# Patient Record
Sex: MALE | Race: WHITE
[De-identification: names, ages, dates, MRNs, and addresses within clinical notes are randomized per-mention and may not be internally consistent; named-entity substitution may affect disease eponyms.]

---

## 2018-11-21 ENCOUNTER — HOSPITAL ENCOUNTER (OUTPATIENT)
Dept: HOSPITAL 33 - SDC | Age: 58
Discharge: HOME | End: 2018-11-21
Attending: FAMILY MEDICINE
Payer: COMMERCIAL

## 2018-11-21 VITALS — SYSTOLIC BLOOD PRESSURE: 155 MMHG | HEART RATE: 84 BPM | DIASTOLIC BLOOD PRESSURE: 83 MMHG

## 2018-11-21 VITALS — OXYGEN SATURATION: 99 %

## 2018-11-21 DIAGNOSIS — Z12.11: Primary | ICD-10-CM

## 2018-11-21 DIAGNOSIS — Z86.010: ICD-10-CM

## 2018-11-21 PROCEDURE — 94250: CPT

## 2018-11-21 NOTE — OP
SURGERY DATE/TIME:  11/21/2018 0745    



PREOPERATIVE DIAGNOSES:

1) Screening colonoscopy.

2) History of colon polyps. 



POSTOPERATIVE DIAGNOSIS:    Normal colon.  



PROCEDURE:    Colonoscopy. 



SURGEON: Morris Munguia M.D.



ANESTHESIA:  MAC by Juaquin Maharaj CRNA.



ESTIMATED BLOOD LOSS:  None. 



SPECIMENS:  None.          



DESCRIPTION OF PROCEDURE: After informed written consent was obtained, the patient was 
taken to the endoscopy suite.  He underwent monitored anesthesia and digital rectal exam 
showed normal sphincter tone and no internal lesions. The scope was inserted into the 
rectum and sequentially the entire colonic mucosa was traversed. The level of cecum was 
reached and verified with direct visualization of ileocecal valve. Upon withdrawal careful 
mucosal inspection revealed no gross abnormalities or lesions. Prior to withdrawal 
retroflexion was performed and showed no internal lesions.  The scope was removed and the 
patient was transferred to the recovery room in good condition.

## 2019-02-21 ENCOUNTER — HOSPITAL ENCOUNTER (EMERGENCY)
Dept: HOSPITAL 33 - ED | Age: 59
Discharge: HOME | End: 2019-02-21
Payer: COMMERCIAL

## 2019-02-21 VITALS — HEART RATE: 65 BPM | OXYGEN SATURATION: 94 % | SYSTOLIC BLOOD PRESSURE: 111 MMHG | DIASTOLIC BLOOD PRESSURE: 75 MMHG

## 2019-02-21 DIAGNOSIS — M54.5: ICD-10-CM
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
## 2019-02-21 DIAGNOSIS — M62.830: ICD-10-CM

## 2019-02-21 DIAGNOSIS — M54.6: Primary | ICD-10-CM
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
## 2019-02-21 PROCEDURE — 72100 X-RAY EXAM L-S SPINE 2/3 VWS: CPT

## 2019-02-21 PROCEDURE — 99284 EMERGENCY DEPT VISIT MOD MDM: CPT

## 2019-02-21 PROCEDURE — 96372 THER/PROPH/DIAG INJ SC/IM: CPT

## 2019-02-21 PROCEDURE — 72072 X-RAY EXAM THORAC SPINE 3VWS: CPT

## 2019-02-21 NOTE — ERPHSYRPT
- History of Present Illness


Time Seen by Provider: 02/21/19 07:35


Source: patient, family


Patient Subjective Stated Complaint: fell off ladder on Sunday and went to see 

Dr. Santos and she gave him the all clear but in bed last night he heard 

something "pop" in his back and now he is having pain


Triage Nursing Assessment: Pt states that he fell off a ladder on Sunday and 

went to see Dr. Santos and she gave him the all clear but in bed last night he 

heard something "pop" in his back and now he is having pain, pain in medial 

upper back, denies numbness or tingling, denies pain with palpatation, rates 

pain 3/10 while walking, pulses normal, vitals wnl, doesn't appear to be in any 

distress


Physician History: 





57 y/o white male fell approx 6 ft off a ladder onto his back 3 days ago. seen 

by pcp monday and pt states his pcp stated all ok. last pm pt twisted and felt 

a pop and has had worsening pain. hurts to breathe. pt had about 20 norco left 

from recent foot surgery but did not take any. no new falls


Timing/Duration: day(s) (3)


Method of Injury: fall


Quality: sharp, stabbing


Back Pain Location: T-spine, lumbar spine


Severity of Pain-Max: moderate


Severity of Pain-Current: moderate


Modifying Factors: Improves With: movement (worsens)


Associated Symptoms: lower back pain, muscle spasms, No loss of bowel control, 

No constipation, No nausea, No vomiting, No problems urinating, No weakness, No 

sensory/motor loss, No tingling in legs/feet


Previous symptoms: recently seen


Allergies/Adverse Reactions: 








No Known Drug Allergies Allergy (Verified 02/21/19 07:29)


 





Home Medications: 








Lisinopril 40 mg PO DAILY 02/21/19 [History]


hydroCHLOROthiazide [Hydrochlorothiazide] 25 mg PO DAILY 02/21/19 [History]





Hx Influenza Vaccination/Date Given: No


Hx Pneumococcal Vaccination/Date Given: No





- Review of Systems


Constitutional: No Symptoms


Eyes: No Symptoms


Ears, Nose, & Throat: No Symptoms


Respiratory: No Symptoms


Cardiac: No Symptoms


Abdominal/Gastrointestinal: No Symptoms


Genitourinary Symptoms: No Symptoms


Musculoskeletal: Back Pain, Fall


Skin: No Symptoms


Neurological: No Symptoms


Psychological: No Symptoms


Endocrine: No Symptoms


Hematologic/Lymphatic: No Symptoms


Immunological/Allergic: No Symptoms


All Other Systems: Reviewed and Negative





- Past Medical History


Pertinent Past Medical History: Yes


Neurological History: No Pertinent History


ENT History: No Pertinent History


Cardiac History: No Pertinent History


Respiratory History: No Pertinent History


Endocrine Medical History: No Pertinent History


Musculoskeletal History: Fractures, Other


GI Medical History: No Pertinent History


 History: No Pertinent History


Psycho-Social History: No Pertinent History


Male Reproductive Disorders: No Pertinent History


Other Medical History: gout, fx hand ankle nose.





- Past Surgical History


Past Surgical History: No


Neuro Surgical History: No Pertinent History


Cardiac: No Pertinent History


Respiratory: No Pertinent History


Gastrointestinal: No Pertinent History


Genitourinary: No Pertinent History


Musculoskeletal: No Pertinent History


Male Surgical History: No Pertinent History


Other Surgical History: tumor taken off of right foot





- Social History


Smoking Status: Former smoker


Exposure to second hand smoke: Yes


Drug Use: none


Patient Lives Alone: No





- Nursing Vital Signs


Nursing Vital Signs: 


 Initial Vital Signs











Temperature  98.2 F   02/21/19 07:21


 


Pulse Rate  74   02/21/19 07:21


 


Blood Pressure  132/84   02/21/19 07:21


 


O2 Sat by Pulse Oximetry  99   02/21/19 07:21








 Pain Scale











Pain Intensity [Upper Medial   3





Back]                          


 


Pain Intensity                 3

















- Physical Exam


General Appearance: mild distress, alert, anxiety


Eye Exam: PERRL/EOMI


Ears, Nose, Throat Exam: normal ENT inspection, moist mucous membranes


Neck Exam: normal inspection, non-tender, supple, full range of motion


Respiratory Exam: normal breath sounds, lungs clear, airway intact, No 

respiratory distress, No accessory muscle use, No rhonchi, No wheezing, No 

stridor


Cardiovascular Exam: regular rate/rhythm, normal heart sounds, normal 

peripheral pulses


Gastrointestinal Exam: soft, normal bowel sounds, No tenderness, No guarding, 

No rebound


Rectal Exam: not done


Back Exam: vertebral tenderness, decreased range of motion, muscle spasm, other 

(area of midthoracic swelling and tenderness)


Extremity Exam: normal inspection, normal range of motion, pelvis stable


Neurologic Exam: alert, oriented x 3, cooperative, CNs II-XII nml as tested


Skin Exam: normal color, warm, dry


Lymphatic Exam: adenopathy


**SpO2 Interpretation**: normal


SpO2: 99


O2 Delivery: Room Air





- Course


Nursing assessment & vital signs reviewed: Yes


Ordered Tests: 


 Active Orders 24 hr











 Category Date Time Status


 


 LUMBAR LIMITED (2 OR 3 VIEWS) Stat Exams  02/21/19 08:08 Completed


 


 THORACIC SPINE (AP,LAT,SWIMM) Stat Exams  02/21/19 08:08 Completed








Medication Summary














Discontinued Medications














Generic Name Dose Route Start Last Admin





  Trade Name [place]  PRN Reason Stop Dose Admin


 


Hydromorphone HCl  1 mg  02/21/19 08:04  02/21/19 08:10





  Hydromorphone 1 Mg/Ml Ampule***  IM  02/21/19 08:05  1 mg





  STAT ONE   Administration





     





     





     





     


 


Hydromorphone HCl  Confirm  02/21/19 08:07  





  Hydromorphone 1 Mg/Ml Ampule***  Administered  02/21/19 08:08  





  Dose   





  1 mg   





  .ROUTE   





  .STK-MED ONE   





     





     





     





     


 


Promethazine HCl  12.5 mg  02/21/19 08:05  02/21/19 08:11





  Phenergan 25 Mg Inj***  IM  02/21/19 08:06  12.5 mg





  STAT ONE   Administration





     





     





     





     


 


Promethazine HCl  Confirm  02/21/19 08:07  





  Phenergan 25 Mg Inj***  Administered  02/21/19 08:08  





  Dose   





  25 mg   





  .ROUTE   





  .STK-MED ONE   





     





     





     





     














- Progress


Progress: improved


Progress Note: 





02/21/19 09:16


xray thoracic and lumbar spine-no acute fx or subluxation


Counseled pt/family regarding: diagnosis, need for follow-up, rad results





- Departure


Time of Disposition: 09:17


Departure Disposition: Home


Clinical Impression: 


 Back pain due to injury





Condition: Stable


Critical Care Time: No


Referrals: 


ARMANDO SANTOS [Primary Care Provider] - 


Additional Instructions: 


take your norco as prescribed. follow up with primary doctor for further 

management. 


Prescriptions: 


Carisoprodol 350 mg** [Soma 350 mg**] 350 mg PO Q8H PRN PRN #10 tablet


 PRN Reason: Muscle Spasms


Prednisone 10 mg*** [Deltasone 10 mg***] 10 mg PO TID #12 tablet

## 2019-02-21 NOTE — XRAY
Indication: Pain following fall.



Comparison: None



Frontal/lateral thoracic spine demonstrates 12 typical rib-bearing thoracic

vertebral segments in normal alignment with vertebral body heights and disc

spaces maintained.  Mild multilevel degenerative endplate spurring.  No other

bony, articular, or soft tissue abnormalities.



Impression: Nonacute thoracic spine with chronic features.

## 2019-02-21 NOTE — XRAY
Indication: Pain following fall.



Comparison: None



3 views of the lumbar spine demonstrates 4 lumbar segments with sacralized L5

in normal alignment with mild multilevel endplate spurring, minimal L4-L5 disc

space narrowing, mild L4-L5 degenerative facet arthropathy, and minimal aortic

calcifications.  No other bony, articular, or soft tissue abnormalities.



Impression: Nonacute lumbar spine with chronic features.

## 2020-11-24 ENCOUNTER — HOSPITAL ENCOUNTER (OUTPATIENT)
Dept: HOSPITAL 33 - ED | Age: 60
Setting detail: OBSERVATION
LOS: 1 days | Discharge: HOME | End: 2020-11-25
Attending: FAMILY MEDICINE | Admitting: FAMILY MEDICINE
Payer: COMMERCIAL

## 2020-11-24 DIAGNOSIS — Z79.01: ICD-10-CM

## 2020-11-24 DIAGNOSIS — I63.9: Primary | ICD-10-CM

## 2020-11-24 DIAGNOSIS — G51.0: ICD-10-CM

## 2020-11-24 DIAGNOSIS — R47.81: ICD-10-CM

## 2020-11-24 DIAGNOSIS — Z79.899: ICD-10-CM

## 2020-11-24 LAB
ALBUMIN SERPL-MCNC: 4.6 G/DL (ref 3.5–5)
ALP SERPL-CCNC: 81 U/L (ref 38–126)
ALT SERPL-CCNC: 24 U/L (ref 0–50)
ANION GAP SERPL CALC-SCNC: 11.1 MEQ/L (ref 5–15)
AST SERPL QL: 27 U/L (ref 17–59)
BASOPHILS # BLD AUTO: 0.03 10*3/UL (ref 0–0.4)
BASOPHILS NFR BLD AUTO: 0.5 % (ref 0–0.4)
BILIRUB BLD-MCNC: 0.5 MG/DL (ref 0.2–1.3)
BUN SERPL-MCNC: 18 MG/DL (ref 9–20)
CALCIUM SPEC-MCNC: 9.3 MG/DL (ref 8.4–10.2)
CHLORIDE SERPL-SCNC: 103 MMOL/L (ref 98–107)
CO2 SERPL-SCNC: 31 MMOL/L (ref 22–30)
CREAT SERPL-MCNC: 1.09 MG/DL (ref 0.66–1.25)
EOSINOPHIL # BLD AUTO: 0.09 10*3/UL (ref 0–0.5)
GFR SERPLBLD BASED ON 1.73 SQ M-ARVRAT: > 60 ML/MIN
GLUCOSE SERPL-MCNC: 87 MG/DL (ref 74–106)
GLUCOSE UR-MCNC: NEGATIVE MG/DL
HCT VFR BLD AUTO: 49.7 % (ref 42–50)
HGB BLD-MCNC: 16.2 GM/DL (ref 12.5–18)
LYMPHOCYTES # SPEC AUTO: 1.62 10*3/UL (ref 1–4.6)
MCH RBC QN AUTO: 31.1 PG (ref 26–32)
MCHC RBC AUTO-ENTMCNC: 32.6 G/DL (ref 32–36)
MONOCYTES # BLD AUTO: 0.87 10*3/UL (ref 0–1.3)
PLATELET # BLD AUTO: 191 K/MM3 (ref 150–450)
POTASSIUM SERPLBLD-SCNC: 4.1 MMOL/L (ref 3.5–5.1)
PROT SERPL-MCNC: 7.4 G/DL (ref 6.3–8.2)
PROT UR STRIP-MCNC: NEGATIVE MG/DL
RBC # BLD AUTO: 5.21 M/MM3 (ref 4.1–5.6)
RBC #/AREA URNS HPF: (no result) /HPF (ref 0–2)
SODIUM SERPL-SCNC: 141 MMOL/L (ref 137–145)
WBC # BLD AUTO: 5.7 K/MM3 (ref 4–10.5)
WBC #/AREA URNS HPF: (no result) /HPF (ref 0–5)

## 2020-11-24 PROCEDURE — 71045 X-RAY EXAM CHEST 1 VIEW: CPT

## 2020-11-24 PROCEDURE — 93041 RHYTHM ECG TRACING: CPT

## 2020-11-24 PROCEDURE — 93268 ECG RECORD/REVIEW: CPT

## 2020-11-24 PROCEDURE — 70450 CT HEAD/BRAIN W/O DYE: CPT

## 2020-11-24 PROCEDURE — 80053 COMPREHEN METABOLIC PANEL: CPT

## 2020-11-24 PROCEDURE — 83721 ASSAY OF BLOOD LIPOPROTEIN: CPT

## 2020-11-24 PROCEDURE — G0378 HOSPITAL OBSERVATION PER HR: HCPCS

## 2020-11-24 PROCEDURE — 94762 N-INVAS EAR/PLS OXIMTRY CONT: CPT

## 2020-11-24 PROCEDURE — 80061 LIPID PANEL: CPT

## 2020-11-24 PROCEDURE — 81001 URINALYSIS AUTO W/SCOPE: CPT

## 2020-11-24 PROCEDURE — 99285 EMERGENCY DEPT VISIT HI MDM: CPT

## 2020-11-24 PROCEDURE — 84484 ASSAY OF TROPONIN QUANT: CPT

## 2020-11-24 PROCEDURE — 36415 COLL VENOUS BLD VENIPUNCTURE: CPT

## 2020-11-24 PROCEDURE — 92523 SPEECH SOUND LANG COMPREHEN: CPT

## 2020-11-24 PROCEDURE — 70551 MRI BRAIN STEM W/O DYE: CPT

## 2020-11-24 PROCEDURE — 94760 N-INVAS EAR/PLS OXIMETRY 1: CPT

## 2020-11-24 PROCEDURE — 93005 ELECTROCARDIOGRAM TRACING: CPT

## 2020-11-24 PROCEDURE — 85025 COMPLETE CBC W/AUTO DIFF WBC: CPT

## 2020-11-24 NOTE — XRAY
Indication: Confusion, slurred speech, and stroke like symptoms.



Comparison: None



Portable chest demonstrates normal heart and lungs.  Bony thorax intact with

old left 5-7 rib fractures.

## 2020-11-24 NOTE — ERPHSYRPT
- History of Present Illness


Time Seen by Provider: 11/24/20 09:15


Source: patient


Physician History: 





Patient is a 60-year-old male presents to our ED with slurred speech and right-

sided facial droop that has been ongoing for 4 days.  Patient was sent to our ED

for an evaluation per his wife's request.  Patient observed the symptoms on 

Friday.  Patient did not think much of it.  At that time he also experienced a 

mild headache.  Symptoms persisted and family became concerned.  Patient has hi

story of untreated hypertension.  Patient states he also has 

hypercholesterolemia.  Patient chews tobacco.  No history of CVA.  No associated

chest pain or shortness of breath.  No nausea vomiting or diaphoresis.  Symptoms

are moderate in intensity.  No specific worsening or improving factors.  Patient

states otherwise healthy.  He voices no other complaints at this time.


Timing/Duration: day(s) (4 days ago)


Severity: moderate


Modifying Factors: Improves With: nothing


Associated Symptoms: other (Slurred speech)


Allergies/Adverse Reactions: 








No Known Drug Allergies Allergy (Verified 11/24/20 09:35)


   





Home Medications: 








No Reportable Medications [No Reported Medications]  11/24/20 [History]





Hx Influenza Vaccination/Date Given: No


Hx Pneumococcal Vaccination/Date Given: No





- Review of Systems


Constitutional: No Symptoms, No Fever, No Chills


Eyes: No Symptoms


Ears, Nose, & Throat: No Symptoms


Respiratory: No Symptoms, No Cough, No Dyspnea


Cardiac: No Symptoms, No Chest Pain, No Edema, No Syncope


Abdominal/Gastrointestinal: No Symptoms, No Abdominal Pain, No Nausea, No 

Vomiting, No Diarrhea


Genitourinary Symptoms: No Symptoms, No Dysuria


Musculoskeletal: No Symptoms, No Back Pain, No Neck Pain


Skin: No Symptoms, No Rash


Neurological: No Symptoms, No Dizziness, No Focal Weakness, No Sensory Changes


Psychological: No Symptoms


Endocrine: No Symptoms


Hematologic/Lymphatic: No Symptoms


Immunological/Allergic: No Symptoms


All Other Systems: Reviewed and Negative





- Past Medical History


Pertinent Past Medical History: Yes


Neurological History: No Pertinent History


ENT History: No Pertinent History


Cardiac History: No Pertinent History


Respiratory History: No Pertinent History


Endocrine Medical History: No Pertinent History


Musculoskeletal History: Fractures, Other


GI Medical History: No Pertinent History


 History: No Pertinent History


Psycho-Social History: No Pertinent History


Male Reproductive Disorders: No Pertinent History


Other Medical History: gout, fx hand ankle nose.





- Past Surgical History


Past Surgical History: No


Neuro Surgical History: No Pertinent History


Cardiac: No Pertinent History


Respiratory: No Pertinent History


Gastrointestinal: No Pertinent History


Genitourinary: No Pertinent History


Musculoskeletal: No Pertinent History


Male Surgical History: No Pertinent History


Other Surgical History: tumor taken off of right foot





- Social History


Smoking Status: Former smoker


Exposure to second hand smoke: Yes


Drug Use: none


Patient Lives Alone: No





- Nursing Vital Signs


Nursing Vital Signs: 


                               Initial Vital Signs











Temperature  97.8 F   11/24/20 09:08


 


Pulse Rate  63   11/24/20 09:08


 


Respiratory Rate  20   11/24/20 09:08


 


Blood Pressure  172/106   11/24/20 09:08


 


O2 Sat by Pulse Oximetry  100   11/24/20 09:08








                                   Pain Scale











Pain Intensity                 0

















- Physical Exam


General Appearance: no apparent distress, alert


Eye Exam: PERRL/EOMI, eyes nml inspection


Ears, Nose, Throat Exam: normal ENT inspection, TMs normal, pharynx normal, 

moist mucous membranes


Neck Exam: normal inspection, non-tender, supple, full range of motion


Respiratory Exam: normal breath sounds, lungs clear, No respiratory distress


Cardiovascular Exam: regular rate/rhythm, normal heart sounds, normal peripheral

 pulses


Gastrointestinal/Abdomen Exam: soft, normal bowel sounds, No tenderness, No mass


Back Exam: normal inspection, normal range of motion, No CVA tenderness, No 

vertebral tenderness


Extremity Exam: normal inspection, normal range of motion, pelvis stable


Neurologic Exam: alert, oriented x 3, cooperative, normal mood/affect, nml 

cerebellar function, nml station & gait, sensation nml, motor weakness, facial 

droop (Right-sided facial droop mild dysarthria), other (Patient states the 

right side of his face is more sensitive than the left.), No motor deficits, No 

disoriented, No confusion


Skin Exam: normal color, warm, dry, No rash


Lymphatic Exam: No adenopathy


**SpO2 Interpretation**: normal


SpO2: 98


O2 Delivery: Room Air





- Course


Nursing assessment & vital signs reviewed: Yes


EKG Interpreted by Me: RATE (61), Sinus Rhythm, NORMAL AXIS, NORMAL INTERVALS





- Radiology Exams


  ** Chest


X-ray Interpretation: Teleradiologist Report (No acute pathology observed.)





- CT Exams


  ** Head


CT Interpretation: Tele-radiologist Report (No acute intracranial process.)


Ordered Tests: 


                               Active Orders 24 hr











 Category Date Time Status


 


 Cardiac Monitor STAT Care  11/24/20 09:31 Active


 


 EKG-ER Only STAT Care  11/24/20 09:30 Active


 


 IV Insertion STAT Care  11/24/20 09:30 Active


 


 NPO (ED) STAT Care  11/24/20 09:30 Active


 


 POCT Glucose Check STAT Care  11/24/20 09:30 Active


 


 Pulse Oximetry (ED) STAT Care  11/24/20 09:30 Active


 


 CHEST 1 VIEW (PORTABLE) Stat Exams  11/24/20 09:30 Completed


 


 HEAD WITHOUT CONTRAST [CT] Stat Exams  11/24/20 09:30 Completed


 


 CBC W DIFF Stat Lab  11/24/20 09:15 Completed


 


 CMP Stat Lab  11/24/20 09:15 Completed


 


 TROPONIN Q3H Lab  11/24/20 10:15 Ordered


 


 TROPONIN Q3H Lab  11/24/20 13:15 Ordered


 


 TROPONIN Q3H Lab  11/24/20 16:15 Ordered


 


 TROPONIN Q3H Lab  11/24/20 19:15 Ordered


 


 TROPONIN Q3H Lab  11/24/20 22:15 Ordered


 


 UA W/RFX UR CULTURE Stat Lab  11/24/20 10:20 Ordered


 


 Transfer Order Routine Transfer  11/24/20 Ordered








Medication Summary











Generic Name Dose Route Start Last Admin





  Trade Name Freq  PRN Reason Stop Dose Admin


 


Sodium Chloride  1,000 mls @ 50 mls/hr  11/24/20 09:30  11/24/20 09:35





  Sodium Chloride 0.9% 1000 Ml  IV  12/24/20 09:29  50 mls/hr





  .Q20H MIGUE   Administration














Discontinued Medications














Generic Name Dose Route Start Last Admin





  Trade Name Freq  PRN Reason Stop Dose Admin


 


Aspirin  324 mg  11/24/20 10:10  11/24/20 10:12





  Baby Aspirin 81 Mg Chew***  PO  11/24/20 10:11  324 mg





  STAT ONE   Administration


 


Aspirin  Confirm  11/24/20 10:12 





  Baby Aspirin 81 Mg Chew***  Administered  11/24/20 10:13 





  Dose  





  324 mg  





  .ROUTE  





  .STK-MED ONE  











Lab/Rad Data: 


                           Laboratory Result Diagrams





                                 11/24/20 09:15 





                                 11/24/20 09:15 





                               Laboratory Results











  11/24/20 11/24/20 Range/Units





  09:15 09:15 


 


WBC   5.7  (4.0-10.5)  K/mm3


 


RBC   5.21  (4.1-5.6)  M/mm3


 


Hgb   16.2  (12.5-18.0)  gm/dl


 


Hct   49.7  (42-50)  %


 


MCV   95.4  ()  fl


 


MCH   31.1  (26-32)  pg


 


MCHC   32.6  (32-36)  g/dl


 


RDW   13.6  (11.5-14.0)  %


 


Plt Count   191  (150-450)  K/mm3


 


MPV   11.1 H  (7.5-11.0)  fl


 


Gran %   54.4  (36.0-66.0)  %


 


Eos # (Auto)   0.09  (0-0.5)  


 


Absolute Lymphs (auto)   1.62  (1.0-4.6)  


 


Absolute Monos (auto)   0.87  (0.0-1.3)  


 


Lymphocytes %   28.3  (24.0-44.0)  %


 


Monocytes %   15.2 H  (0.0-12.0)  %


 


Eosinophils %   1.6  (0.00-5.0)  %


 


Basophils %   0.5  (0.0-0.4)  %


 


Absolute Granulocytes   3.11  (1.4-6.9)  


 


Basophils #   0.03  (0-0.4)  


 


Sodium  141   (137-145)  mmol/L


 


Potassium  4.1   (3.5-5.1)  mmol/L


 


Chloride  103   ()  mmol/L


 


Carbon Dioxide  31 H   (22-30)  mmol/L


 


Anion Gap  11.1   (5-15)  MEQ/L


 


BUN  18   (9-20)  mg/dL


 


Creatinine  1.09   (0.66-1.25)  mg/dL


 


Estimated GFR  > 60.0   ML/MIN


 


Glucose  87   ()  mg/dL


 


Calcium  9.3   (8.4-10.2)  mg/dL


 


Total Bilirubin  0.50   (0.2-1.3)  mg/dL


 


AST  27   (17-59)  U/L


 


ALT  24   (0-50)  U/L


 


Alkaline Phosphatase  81   ()  U/L


 


Serum Total Protein  7.4   (6.3-8.2)  g/dL


 


Albumin  4.6   (3.5-5.0)  g/dL














- Progress


Progress: improved


Progress Note: 





11/24/20 10:22


Patient reassessed.  Repeat neuro exam unchanged.  CT head negative for acute 

intracranial pathology.  Aspirin administered.  IV fluids normal saline running 

at 75 cc/h.  It appears that patient did have a stroke based on history of pres

ent illness, risk factors and physical exam findings.  Patient will need full 

work-up at the admitting physician's discretion.  Plan of care discussed with 

patient.  He agrees to admission to [facility] for 

further evaluation and treatment.  Patient voices no other complaints or 

concerns at this time.  Case discussed with Dr. Summers covering Dr. Saavedra.  Dr. Summers accepts admission to observation.  Patient is not a candidate for TPA due 

to patient being outside of therapeutic window for TPA.  Patient symptoms 

occurred 4 days ago.


11/24/20 10:24





11/24/20 10:25





Discussed with : Fabiola


Will see patient in: hospital (observation)


Counseled pt/family regarding: lab results, diagnosis, rad results





- Departure


Departure Disposition: Observation


Clinical Impression: 


 Stroke, Dysarthria, Facial droop





Condition: Stable


Critical Care Time: No


Referrals: 


NOHEMI SAAVEDRA MD [Primary Care Provider] -

## 2020-11-24 NOTE — XRAY
Indication: Confusion, slurred speech, and stroke like symptoms.



Multiple contiguous axial images obtained through the head without contrast.



Comparison: None



Age-appropriate global atrophy and minimal periventricular degenerative

micro-ischemia bilaterally.  No acute intracranial hemorrhage, abnormal

extra-axial fluid collection, or mass effect.  Fourth ventricle is midline

without hydrocephalus.  Gray-white matter differentiation preserved.  Bony

calvarium intact.  Visualized paranasal sinuses and mastoid air cells are

clear.



Impression: Normal aging brain including atrophy and degenerative

micro-ischemia.  No acute intracranial abnormalities.

## 2020-11-25 VITALS — OXYGEN SATURATION: 98 % | DIASTOLIC BLOOD PRESSURE: 95 MMHG | HEART RATE: 87 BPM | SYSTOLIC BLOOD PRESSURE: 174 MMHG

## 2020-11-25 LAB
CHOLESTANOL SERPL-MCNC: 215 MG/DL (ref 50–200)
HDLC SERPL-MCNC: 43 MG/DL (ref 40–60)
LDLC SERPL DIRECT ASSAY-MCNC: 133 MG/DL (ref 30–100)
TRIGL SERPL-MCNC: 120 MG/DL (ref 30–150)

## 2020-11-25 NOTE — PCM.SSS
History of Present Illness





- Chief Complaint


Chief Complaint: stroke


History of Present Illness: 


 is a 60 year old male who presented to the ER yesterday, 3-4 days prior

to arrival he had developed slurred speech and drooping of his face on the left,

there were no associated deficits in the upper or lower extremity, he relays 

some cold symptoms prior to onset but never felt poorly. no affect on his 

ability to eat or drink, no problems with vision, no headache. he was admitted 

for possible stroke, ct was negative.








- Review of Systems


Constitutional: No Fever, No Chills


Respiratory: No Cough, No Short Of Breath


Cardiac: No Chest Pain, No Edema, No Syncope


Abdominal/Gastrointestinal: No Abdominal Pain, No Nausea, No Vomiting, No 

Diarrhea


Neurological: Focal Weakness, Speech Changes, No Dizziness, No Gait Changes, No 

Headache, No Seizure, No Tremors, No Vertigo


Psychological: No Symptoms


All Other Systems: Reviewed and Negative





Medications & Allergies


Home Medications: 


                              Home Medication List





Acyclovir 800 mg*** [Zovirax 800 mg***] 800 mg PO 5XD #35 tablet 11/25/20 [Rx]


Prednisone 20 mg*** [Deltasone 20 mg***] 20 mg PO UD #18 tablet 11/25/20 [Rx]








Allergies/Adverse Reactions: 


                                    Allergies











Allergy/AdvReac Type Severity Reaction Status Date / Time


 


No Known Drug Allergies Allergy   Verified 11/24/20 09:35














- Past Medical History


Past Medical History: Yes


Neurological History: No Pertinent History


ENT History: No Pertinent History


Cardiac History: No Pertinent History


Respiratory History: No Pertinent History


Endocrine Medical History: No Pertinent History


Musculoskelatal History: Fractures, Other


GI Medical History: No Pertinent History


 History: No Pertinent History


Pyscho-Social History: No Pertinent History


Male Reproductive Disorders: No Pertinent History


Comment: gout, fx hand ankle nose.





- Past Surgical History


Past Surgical History: No


Neuro Surgical History: No Pertinent History


Cardiac History: No Pertinent History


Respiratory Surgery: No Pertinent History


GI Surgical History: No Pertinent History


Genitourinary Surgical Hx: No Pertinent History


Musculskeletal Surgical Hx: No Pertinent History


Male Surgical History: No Pertinent History


Other Surgical History: tumor taken off of right foot





- Social History


Smoking Status: Former smoker


Exposure to second hand smoke: Yes


Alcohol: None


Drug Use: none





- Physical Exam


Vital Signs: 


                               Vital Signs - 24 hr











  Temp Pulse Resp BP Pulse Ox


 


 11/25/20 08:03  98.4 F  63  16  136/94  97


 


 11/25/20 07:07      95


 


 11/25/20 04:19  98.2 F  75  16  155/85  95


 


 11/24/20 23:42  98.0 F  74  15  126/70  97


 


 11/24/20 20:01  98.3 F  66  16  145/87  95


 


 11/24/20 19:17      93 L


 


 11/24/20 16:00   80  20  161/87  95


 


 11/24/20 15:17      98


 


 11/24/20 11:42  98.5 F  82   148/91  98


 


 11/24/20 10:26      98


 


 11/24/20 10:17   65  18  140/97  98


 


 11/24/20 09:36      100


 


 11/24/20 09:08  97.8 F  63  20  172/106  100











General Appearance: no apparent distress


Neurologic Exam: alert, oriented x 3, cooperative, nml cerebellar function, nml 

station & gait, sensation nml, facial droop (left, no involvement of ocular 

muscles, able to close both eyes and no weakness in closing eyes or raising 

eyebrows), slurred speech, No motor deficits, No sensory deficit, No disoriented


Respiratory Exam: normal breath sounds, lungs clear, No respiratory distress


Cardiovascular Exam: regular rate/rhythm, normal heart sounds, normal peripheral

pulses


Gastrointestinal/Abdomen Exam: soft, normal bowel sounds, No tenderness, No mass


Extremity Exam: normal inspection, normal range of motion, pelvis stable


Skin Exam: normal color, warm, dry, No rash





Results





- Labs


Lab/Micro Results: 


                            Lab Results-Last 24 Hours











  11/24/20 11/24/20 11/24/20 Range/Units





  09:15 09:15 10:16 


 


WBC  5.7    (4.0-10.5)  K/mm3


 


RBC  5.21    (4.1-5.6)  M/mm3


 


Hgb  16.2    (12.5-18.0)  gm/dl


 


Hct  49.7    (42-50)  %


 


MCV  95.4    ()  fl


 


MCH  31.1    (26-32)  pg


 


MCHC  32.6    (32-36)  g/dl


 


RDW  13.6    (11.5-14.0)  %


 


Plt Count  191    (150-450)  K/mm3


 


MPV  11.1 H    (7.5-11.0)  fl


 


Gran %  54.4    (36.0-66.0)  %


 


Eos # (Auto)  0.09    (0-0.5)  


 


Absolute Lymphs (auto)  1.62    (1.0-4.6)  


 


Absolute Monos (auto)  0.87    (0.0-1.3)  


 


Lymphocytes %  28.3    (24.0-44.0)  %


 


Monocytes %  15.2 H    (0.0-12.0)  %


 


Eosinophils %  1.6    (0.00-5.0)  %


 


Basophils %  0.5    (0.0-0.4)  %


 


Absolute Granulocytes  3.11    (1.4-6.9)  


 


Basophils #  0.03    (0-0.4)  


 


Sodium   141   (137-145)  mmol/L


 


Potassium   4.1   (3.5-5.1)  mmol/L


 


Chloride   103   ()  mmol/L


 


Carbon Dioxide   31 H   (22-30)  mmol/L


 


Anion Gap   11.1   (5-15)  MEQ/L


 


BUN   18   (9-20)  mg/dL


 


Creatinine   1.09   (0.66-1.25)  mg/dL


 


Estimated GFR   > 60.0   ML/MIN


 


Glucose   87   ()  mg/dL


 


Calcium   9.3   (8.4-10.2)  mg/dL


 


Total Bilirubin   0.50   (0.2-1.3)  mg/dL


 


AST   27   (17-59)  U/L


 


ALT   24   (0-50)  U/L


 


Alkaline Phosphatase   81   ()  U/L


 


Troponin I    < 0.012  (0.000-0.034)  ng/mL


 


Serum Total Protein   7.4   (6.3-8.2)  g/dL


 


Albumin   4.6   (3.5-5.0)  g/dL


 


Triglycerides     ()  mg/dL


 


Cholesterol     ()  mg/dL


 


LDL Cholesterol     ()  mg/dL


 


HDL Cholesterol     (40-60)  mg/dL


 


Heart Disease Risk Ratio     


 


Urine Color     (YELLOW)  


 


Urine Appearance     (CLEAR)  


 


Urine pH     (5-6)  


 


Ur Specific Gravity     (1.005-1.025)  


 


Urine Protein     (Negative)  


 


Urine Ketones     (NEGATIVE)  


 


Urine Blood     (0-5)  Jesus/ul


 


Urine Nitrite     (NEGATIVE)  


 


Urine Bilirubin     (NEGATIVE)  


 


Urine Urobilinogen     (0-1)  mg/dL


 


Ur Leukocyte Esterase     (NEGATIVE)  


 


Urine WBC (Auto)     (0-5)  /HPF


 


Urine RBC (Auto)     (0-2)  /HPF


 


U Hyaline Cast (Auto)     (0-2)  /LPF


 


U Epithel Cells (Auto)     (FEW)  /HPF


 


Urine Bacteria (Auto)     (NEGATIVE)  /HPF


 


Urine Mucus (Auto)     (NEGATIVE)  /HPF


 


Urine Culture Reflexed     (NO)  


 


Urine Glucose     (NEGATIVE)  mg/dL














  11/24/20 11/24/20 11/24/20 Range/Units





  10:20 13:25 16:10 


 


WBC     (4.0-10.5)  K/mm3


 


RBC     (4.1-5.6)  M/mm3


 


Hgb     (12.5-18.0)  gm/dl


 


Hct     (42-50)  %


 


MCV     ()  fl


 


MCH     (26-32)  pg


 


MCHC     (32-36)  g/dl


 


RDW     (11.5-14.0)  %


 


Plt Count     (150-450)  K/mm3


 


MPV     (7.5-11.0)  fl


 


Gran %     (36.0-66.0)  %


 


Eos # (Auto)     (0-0.5)  


 


Absolute Lymphs (auto)     (1.0-4.6)  


 


Absolute Monos (auto)     (0.0-1.3)  


 


Lymphocytes %     (24.0-44.0)  %


 


Monocytes %     (0.0-12.0)  %


 


Eosinophils %     (0.00-5.0)  %


 


Basophils %     (0.0-0.4)  %


 


Absolute Granulocytes     (1.4-6.9)  


 


Basophils #     (0-0.4)  


 


Sodium     (137-145)  mmol/L


 


Potassium     (3.5-5.1)  mmol/L


 


Chloride     ()  mmol/L


 


Carbon Dioxide     (22-30)  mmol/L


 


Anion Gap     (5-15)  MEQ/L


 


BUN     (9-20)  mg/dL


 


Creatinine     (0.66-1.25)  mg/dL


 


Estimated GFR     ML/MIN


 


Glucose     ()  mg/dL


 


Calcium     (8.4-10.2)  mg/dL


 


Total Bilirubin     (0.2-1.3)  mg/dL


 


AST     (17-59)  U/L


 


ALT     (0-50)  U/L


 


Alkaline Phosphatase     ()  U/L


 


Troponin I   < 0.012  < 0.012  (0.000-0.034)  ng/mL


 


Serum Total Protein     (6.3-8.2)  g/dL


 


Albumin     (3.5-5.0)  g/dL


 


Triglycerides     ()  mg/dL


 


Cholesterol     ()  mg/dL


 


LDL Cholesterol     ()  mg/dL


 


HDL Cholesterol     (40-60)  mg/dL


 


Heart Disease Risk Ratio     


 


Urine Color  YELLOW    (YELLOW)  


 


Urine Appearance  CLEAR    (CLEAR)  


 


Urine pH  6.0    (5-6)  


 


Ur Specific Gravity  1.016    (1.005-1.025)  


 


Urine Protein  NEGATIVE    (Negative)  


 


Urine Ketones  NEGATIVE    (NEGATIVE)  


 


Urine Blood  NEGATIVE    (0-5)  Jesus/ul


 


Urine Nitrite  NEGATIVE    (NEGATIVE)  


 


Urine Bilirubin  NEGATIVE    (NEGATIVE)  


 


Urine Urobilinogen  NEGATIVE    (0-1)  mg/dL


 


Ur Leukocyte Esterase  NEGATIVE    (NEGATIVE)  


 


Urine WBC (Auto)  NONE    (0-5)  /HPF


 


Urine RBC (Auto)  NONE    (0-2)  /HPF


 


U Hyaline Cast (Auto)  0-2    (0-2)  /LPF


 


U Epithel Cells (Auto)  NONE    (FEW)  /HPF


 


Urine Bacteria (Auto)  NONE    (NEGATIVE)  /HPF


 


Urine Mucus (Auto)  SLIGHT    (NEGATIVE)  /HPF


 


Urine Culture Reflexed  NO    (NO)  


 


Urine Glucose  NEGATIVE    (NEGATIVE)  mg/dL














  11/24/20 11/24/20 11/25/20 Range/Units





  19:05 22:00 04:46 


 


WBC     (4.0-10.5)  K/mm3


 


RBC     (4.1-5.6)  M/mm3


 


Hgb     (12.5-18.0)  gm/dl


 


Hct     (42-50)  %


 


MCV     ()  fl


 


MCH     (26-32)  pg


 


MCHC     (32-36)  g/dl


 


RDW     (11.5-14.0)  %


 


Plt Count     (150-450)  K/mm3


 


MPV     (7.5-11.0)  fl


 


Gran %     (36.0-66.0)  %


 


Eos # (Auto)     (0-0.5)  


 


Absolute Lymphs (auto)     (1.0-4.6)  


 


Absolute Monos (auto)     (0.0-1.3)  


 


Lymphocytes %     (24.0-44.0)  %


 


Monocytes %     (0.0-12.0)  %


 


Eosinophils %     (0.00-5.0)  %


 


Basophils %     (0.0-0.4)  %


 


Absolute Granulocytes     (1.4-6.9)  


 


Basophils #     (0-0.4)  


 


Sodium     (137-145)  mmol/L


 


Potassium     (3.5-5.1)  mmol/L


 


Chloride     ()  mmol/L


 


Carbon Dioxide     (22-30)  mmol/L


 


Anion Gap     (5-15)  MEQ/L


 


BUN     (9-20)  mg/dL


 


Creatinine     (0.66-1.25)  mg/dL


 


Estimated GFR     ML/MIN


 


Glucose     ()  mg/dL


 


Calcium     (8.4-10.2)  mg/dL


 


Total Bilirubin     (0.2-1.3)  mg/dL


 


AST     (17-59)  U/L


 


ALT     (0-50)  U/L


 


Alkaline Phosphatase     ()  U/L


 


Troponin I  < 0.012  < 0.012   (0.000-0.034)  ng/mL


 


Serum Total Protein     (6.3-8.2)  g/dL


 


Albumin     (3.5-5.0)  g/dL


 


Triglycerides    120  ()  mg/dL


 


Cholesterol    215 H  ()  mg/dL


 


LDL Cholesterol    133 H  ()  mg/dL


 


HDL Cholesterol    43  (40-60)  mg/dL


 


Heart Disease Risk Ratio    5.0  


 


Urine Color     (YELLOW)  


 


Urine Appearance     (CLEAR)  


 


Urine pH     (5-6)  


 


Ur Specific Gravity     (1.005-1.025)  


 


Urine Protein     (Negative)  


 


Urine Ketones     (NEGATIVE)  


 


Urine Blood     (0-5)  Jesus/ul


 


Urine Nitrite     (NEGATIVE)  


 


Urine Bilirubin     (NEGATIVE)  


 


Urine Urobilinogen     (0-1)  mg/dL


 


Ur Leukocyte Esterase     (NEGATIVE)  


 


Urine WBC (Auto)     (0-5)  /HPF


 


Urine RBC (Auto)     (0-2)  /HPF


 


U Hyaline Cast (Auto)     (0-2)  /LPF


 


U Epithel Cells (Auto)     (FEW)  /HPF


 


Urine Bacteria (Auto)     (NEGATIVE)  /HPF


 


Urine Mucus (Auto)     (NEGATIVE)  /HPF


 


Urine Culture Reflexed     (NO)  


 


Urine Glucose     (NEGATIVE)  mg/dL








                                   Accuchecks











Date                           11/24/20


 


Time                           09:44

















- Radiology Impressions


Radiology Exams & Impressions: 


                              Radiology Procedures











 Category Date Time Status


 


 CHEST 1 VIEW (PORTABLE) Stat Exams  11/24/20 09:30 Completed


 


 HEAD WITHOUT CONTRAST [CT] Stat Exams  11/24/20 09:30 Completed


 


 MRI BRAIN W/O CONTRAST [MRI] Routine Exams  11/25/20 08:23 Ordered














- Other Procedures and Tests


                               Respiratory Therapy





11/26/20 05:00


EKG ONCE 





11/27/20 05:00


EKG ONCE 














Assessment/Plan


(1) Bell's palsy


Current Visit: Yes   Status: Acute   


Assessment & Plan: 


history with no motor weakness of upper/lower extremity and sparing of left eye 

muscles supports peripheral nerve lesion ie Bell's palsy, discussed if MRI is 

negative will treat for Bell's and f/u in office in 2 weeks. recommend steroid 

and acyclovir course, no need for patching due to sparing of eye.


Code(s): G51.0 - BELL'S PALSY   





(2) Facial droop


Current Visit: Yes   Status: Acute   Code(s): R29.810 - FACIAL WEAKNESS   





Hospital Summary





- Vitals & Intake/Output


Vital Signs: 


                                   Vital Signs











Temperature  98.4 F   11/25/20 08:03


 


Pulse Rate  63   11/25/20 08:03


 


Respiratory Rate  16   11/25/20 08:03


 


Blood Pressure  136/94   11/25/20 08:03


 


O2 Sat by Pulse Oximetry  97   11/25/20 08:03











Intake & Output: 


                                 Intake & Output











 11/22/20 11/23/20 11/24/20 11/25/20





 11:59 11:59 11:59 11:59


 


Intake Total    2416


 


Balance    2416


 


Weight   98.8 kg 98.7 kg














- Lab


Result Diagrams: 


                                 11/24/20 09:15





                                 11/24/20 09:15


Lab Results-Last 24 Hrs: 


                            Lab Results-Last 24 Hours











  11/24/20 11/24/20 11/24/20 Range/Units





  09:15 09:15 10:16 


 


WBC  5.7    (4.0-10.5)  K/mm3


 


RBC  5.21    (4.1-5.6)  M/mm3


 


Hgb  16.2    (12.5-18.0)  gm/dl


 


Hct  49.7    (42-50)  %


 


MCV  95.4    ()  fl


 


MCH  31.1    (26-32)  pg


 


MCHC  32.6    (32-36)  g/dl


 


RDW  13.6    (11.5-14.0)  %


 


Plt Count  191    (150-450)  K/mm3


 


MPV  11.1 H    (7.5-11.0)  fl


 


Gran %  54.4    (36.0-66.0)  %


 


Eos # (Auto)  0.09    (0-0.5)  


 


Absolute Lymphs (auto)  1.62    (1.0-4.6)  


 


Absolute Monos (auto)  0.87    (0.0-1.3)  


 


Lymphocytes %  28.3    (24.0-44.0)  %


 


Monocytes %  15.2 H    (0.0-12.0)  %


 


Eosinophils %  1.6    (0.00-5.0)  %


 


Basophils %  0.5    (0.0-0.4)  %


 


Absolute Granulocytes  3.11    (1.4-6.9)  


 


Basophils #  0.03    (0-0.4)  


 


Sodium   141   (137-145)  mmol/L


 


Potassium   4.1   (3.5-5.1)  mmol/L


 


Chloride   103   ()  mmol/L


 


Carbon Dioxide   31 H   (22-30)  mmol/L


 


Anion Gap   11.1   (5-15)  MEQ/L


 


BUN   18   (9-20)  mg/dL


 


Creatinine   1.09   (0.66-1.25)  mg/dL


 


Estimated GFR   > 60.0   ML/MIN


 


Glucose   87   ()  mg/dL


 


Calcium   9.3   (8.4-10.2)  mg/dL


 


Total Bilirubin   0.50   (0.2-1.3)  mg/dL


 


AST   27   (17-59)  U/L


 


ALT   24   (0-50)  U/L


 


Alkaline Phosphatase   81   ()  U/L


 


Troponin I    < 0.012  (0.000-0.034)  ng/mL


 


Serum Total Protein   7.4   (6.3-8.2)  g/dL


 


Albumin   4.6   (3.5-5.0)  g/dL


 


Triglycerides     ()  mg/dL


 


Cholesterol     ()  mg/dL


 


LDL Cholesterol     ()  mg/dL


 


HDL Cholesterol     (40-60)  mg/dL


 


Heart Disease Risk Ratio     


 


Urine Color     (YELLOW)  


 


Urine Appearance     (CLEAR)  


 


Urine pH     (5-6)  


 


Ur Specific Gravity     (1.005-1.025)  


 


Urine Protein     (Negative)  


 


Urine Ketones     (NEGATIVE)  


 


Urine Blood     (0-5)  Jesus/ul


 


Urine Nitrite     (NEGATIVE)  


 


Urine Bilirubin     (NEGATIVE)  


 


Urine Urobilinogen     (0-1)  mg/dL


 


Ur Leukocyte Esterase     (NEGATIVE)  


 


Urine WBC (Auto)     (0-5)  /HPF


 


Urine RBC (Auto)     (0-2)  /HPF


 


U Hyaline Cast (Auto)     (0-2)  /LPF


 


U Epithel Cells (Auto)     (FEW)  /HPF


 


Urine Bacteria (Auto)     (NEGATIVE)  /HPF


 


Urine Mucus (Auto)     (NEGATIVE)  /HPF


 


Urine Culture Reflexed     (NO)  


 


Urine Glucose     (NEGATIVE)  mg/dL














  11/24/20 11/24/20 11/24/20 Range/Units





  10:20 13:25 16:10 


 


WBC     (4.0-10.5)  K/mm3


 


RBC     (4.1-5.6)  M/mm3


 


Hgb     (12.5-18.0)  gm/dl


 


Hct     (42-50)  %


 


MCV     ()  fl


 


MCH     (26-32)  pg


 


MCHC     (32-36)  g/dl


 


RDW     (11.5-14.0)  %


 


Plt Count     (150-450)  K/mm3


 


MPV     (7.5-11.0)  fl


 


Gran %     (36.0-66.0)  %


 


Eos # (Auto)     (0-0.5)  


 


Absolute Lymphs (auto)     (1.0-4.6)  


 


Absolute Monos (auto)     (0.0-1.3)  


 


Lymphocytes %     (24.0-44.0)  %


 


Monocytes %     (0.0-12.0)  %


 


Eosinophils %     (0.00-5.0)  %


 


Basophils %     (0.0-0.4)  %


 


Absolute Granulocytes     (1.4-6.9)  


 


Basophils #     (0-0.4)  


 


Sodium     (137-145)  mmol/L


 


Potassium     (3.5-5.1)  mmol/L


 


Chloride     ()  mmol/L


 


Carbon Dioxide     (22-30)  mmol/L


 


Anion Gap     (5-15)  MEQ/L


 


BUN     (9-20)  mg/dL


 


Creatinine     (0.66-1.25)  mg/dL


 


Estimated GFR     ML/MIN


 


Glucose     ()  mg/dL


 


Calcium     (8.4-10.2)  mg/dL


 


Total Bilirubin     (0.2-1.3)  mg/dL


 


AST     (17-59)  U/L


 


ALT     (0-50)  U/L


 


Alkaline Phosphatase     ()  U/L


 


Troponin I   < 0.012  < 0.012  (0.000-0.034)  ng/mL


 


Serum Total Protein     (6.3-8.2)  g/dL


 


Albumin     (3.5-5.0)  g/dL


 


Triglycerides     ()  mg/dL


 


Cholesterol     ()  mg/dL


 


LDL Cholesterol     ()  mg/dL


 


HDL Cholesterol     (40-60)  mg/dL


 


Heart Disease Risk Ratio     


 


Urine Color  YELLOW    (YELLOW)  


 


Urine Appearance  CLEAR    (CLEAR)  


 


Urine pH  6.0    (5-6)  


 


Ur Specific Gravity  1.016    (1.005-1.025)  


 


Urine Protein  NEGATIVE    (Negative)  


 


Urine Ketones  NEGATIVE    (NEGATIVE)  


 


Urine Blood  NEGATIVE    (0-5)  Jesus/ul


 


Urine Nitrite  NEGATIVE    (NEGATIVE)  


 


Urine Bilirubin  NEGATIVE    (NEGATIVE)  


 


Urine Urobilinogen  NEGATIVE    (0-1)  mg/dL


 


Ur Leukocyte Esterase  NEGATIVE    (NEGATIVE)  


 


Urine WBC (Auto)  NONE    (0-5)  /HPF


 


Urine RBC (Auto)  NONE    (0-2)  /HPF


 


U Hyaline Cast (Auto)  0-2    (0-2)  /LPF


 


U Epithel Cells (Auto)  NONE    (FEW)  /HPF


 


Urine Bacteria (Auto)  NONE    (NEGATIVE)  /HPF


 


Urine Mucus (Auto)  SLIGHT    (NEGATIVE)  /HPF


 


Urine Culture Reflexed  NO    (NO)  


 


Urine Glucose  NEGATIVE    (NEGATIVE)  mg/dL














  11/24/20 11/24/20 11/25/20 Range/Units





  19:05 22:00 04:46 


 


WBC     (4.0-10.5)  K/mm3


 


RBC     (4.1-5.6)  M/mm3


 


Hgb     (12.5-18.0)  gm/dl


 


Hct     (42-50)  %


 


MCV     ()  fl


 


MCH     (26-32)  pg


 


MCHC     (32-36)  g/dl


 


RDW     (11.5-14.0)  %


 


Plt Count     (150-450)  K/mm3


 


MPV     (7.5-11.0)  fl


 


Gran %     (36.0-66.0)  %


 


Eos # (Auto)     (0-0.5)  


 


Absolute Lymphs (auto)     (1.0-4.6)  


 


Absolute Monos (auto)     (0.0-1.3)  


 


Lymphocytes %     (24.0-44.0)  %


 


Monocytes %     (0.0-12.0)  %


 


Eosinophils %     (0.00-5.0)  %


 


Basophils %     (0.0-0.4)  %


 


Absolute Granulocytes     (1.4-6.9)  


 


Basophils #     (0-0.4)  


 


Sodium     (137-145)  mmol/L


 


Potassium     (3.5-5.1)  mmol/L


 


Chloride     ()  mmol/L


 


Carbon Dioxide     (22-30)  mmol/L


 


Anion Gap     (5-15)  MEQ/L


 


BUN     (9-20)  mg/dL


 


Creatinine     (0.66-1.25)  mg/dL


 


Estimated GFR     ML/MIN


 


Glucose     ()  mg/dL


 


Calcium     (8.4-10.2)  mg/dL


 


Total Bilirubin     (0.2-1.3)  mg/dL


 


AST     (17-59)  U/L


 


ALT     (0-50)  U/L


 


Alkaline Phosphatase     ()  U/L


 


Troponin I  < 0.012  < 0.012   (0.000-0.034)  ng/mL


 


Serum Total Protein     (6.3-8.2)  g/dL


 


Albumin     (3.5-5.0)  g/dL


 


Triglycerides    120  ()  mg/dL


 


Cholesterol    215 H  ()  mg/dL


 


LDL Cholesterol    133 H  ()  mg/dL


 


HDL Cholesterol    43  (40-60)  mg/dL


 


Heart Disease Risk Ratio    5.0  


 


Urine Color     (YELLOW)  


 


Urine Appearance     (CLEAR)  


 


Urine pH     (5-6)  


 


Ur Specific Gravity     (1.005-1.025)  


 


Urine Protein     (Negative)  


 


Urine Ketones     (NEGATIVE)  


 


Urine Blood     (0-5)  Jesus/ul


 


Urine Nitrite     (NEGATIVE)  


 


Urine Bilirubin     (NEGATIVE)  


 


Urine Urobilinogen     (0-1)  mg/dL


 


Ur Leukocyte Esterase     (NEGATIVE)  


 


Urine WBC (Auto)     (0-5)  /HPF


 


Urine RBC (Auto)     (0-2)  /HPF


 


U Hyaline Cast (Auto)     (0-2)  /LPF


 


U Epithel Cells (Auto)     (FEW)  /HPF


 


Urine Bacteria (Auto)     (NEGATIVE)  /HPF


 


Urine Mucus (Auto)     (NEGATIVE)  /HPF


 


Urine Culture Reflexed     (NO)  


 


Urine Glucose     (NEGATIVE)  mg/dL











Micro Results-Entire Visit: 


                                   Accuchecks











Date                           11/24/20


 


Time                           09:44

















- Radiology Exams


Ordered Rad Exams-Entire Visit: 


                              Radiology Procedures











 Category Date Time Status


 


 CHEST 1 VIEW (PORTABLE) Stat Exams  11/24/20 09:30 Completed


 


 HEAD WITHOUT CONTRAST [CT] Stat Exams  11/24/20 09:30 Completed


 


 MRI BRAIN W/O CONTRAST [MRI] Routine Exams  11/25/20 08:23 Ordered














- Procedures and Test


Procedures and Tests throughout Hospitalization: 


                            Therapy Orders & Screens





11/24/20 11:55


OT Screen per Nursing Assess ONCE 


   Comment: Protocol Order


   Physician Instructions: Greater than 3 points order OT Admission Screening


   Reason For Exam: Triggered on Admission


   Diagnosis: stroke


   Open Wound/Cellutlitis/Pressure Ulcers: No


   Acute Fx/ORIF/Change in wt bearing status: No


   Severe MUSCULOSKELETAL pain: No


   ADL Dysfunction: No


   Acute CVA w/Hemiparesis/Hemiplegia: Yes


   Decreased Functional Mobility/Strength: No


   Sprain/Strain: No


   Acute Post-op Mobility Dysfunction: No


   Total Points: 5


PT Screen per Nursing Assess ONCE 


   Comment: Protocol Order


   Physician Instructions: Greater than 3 points order PT Admission Screenin


   Reason For Exam: Triggered on Admission


   Diagnosis: stroke


   Open Wound/Cellutlitis/Pressure Ulcers: No


   Acute Fx/ORIF/Change in wt bearing status: No


   Severe MUSCULOSKELETAL pain: No


   ADL Dysfunction: No


   Acute CVA w/Hemiparesis/Hemiplegia: Yes


   Decreased Functional Mobility/Strength: No


   Sprain/Strain: No


   Acute Post-op Mobility Dysfunction: No


   Total Points: 5





11/24/20 14:02


Speech Therapy Eval & Treat [ST Eval & Treat (MD Order)] .as ordered 


   Comment: 


   Physician Instructions: 


   Reason For Exam: 


   Evaluate: cva


   Treat: speech deficit


   Reason for Eval: cva


   Diagnosis: stroke





11/24/20 17:30


EKG Q8HX2,QAMX3,PRN 


   Comment: 





11/25/20 05:00


EKG ONCE 


   Comment: 


   Diagnosis: stroke





11/26/20 05:00


EKG ONCE 


   Comment: 


   Diagnosis: stroke





11/27/20 05:00


EKG ONCE 


   Comment: 


   Diagnosis: stroke














- Discharge


Disposition: Home, Self-Care


Condition: Stable


Prescriptions: 


New


   Prednisone 20 mg*** [Deltasone 20 mg***] 20 mg PO UD #18 tablet


   Acyclovir 800 mg*** [Zovirax 800 mg***] 800 mg PO 5XD #35 tablet


Follow up with: 


NOHEMI SAAVEDRA MD [Primary Care Provider] -

## 2020-11-25 NOTE — XRAY
Indication: Facial droop.  Slurred speech.  Possible Bell's palsy.



Sagittal, coronal, and axial MRI brain was performed without contrast using

T1, T2, FLAIR, diffusion, and ADC sequences.



Comparison: None



Age-appropriate global atrophy and mild/moderate periventricular degenerative

micro-ischemia signal bilaterally.  Left mid centrum semiovale demonstrates 5

x 12 mm focus of restricted signal favoring acute micro-ischemia.  Right

frontal lobe demonstrates 9 mm focus of old infarct with surrounding gliosis.

No acute intracranial hemorrhage, abnormal extra-axial fluid collection, or

mass effect.  Fourth ventricle is midline without hydrocephalus.  7/8 cranial

nerve complex bilaterally symmetric.  Normal flow-void signal within the major

intracerebral circulation.  Normal appearing craniocervical junction and sella

turcica.  Visualized paranasal sinuses are clear.



Impression:

1.  Atrophy and degenerative micro-ischemia within normal limits for patient's

age.

2.  5 x 12 mm focus acute ischemia left mid centrum semiovale.  No acute

hemorrhage or mass effect.

3.  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

## 2020-11-25 NOTE — PCM.DCORD
- Discharge


Disposition: Home, Self-Care


Condition: Stable


Prescriptions: 


New


   Atorvastatin Calcium 20 mg PO DAILY #30 tablet


   Aspirin EC 81 mg*** [Ecotrin 81 mg***] 81 mg PO DAILY #30 tablet


   Clopidogrel Bisulfate 75 mg*** [PLAVIX 75 MG Tablet***] 75 mg PO DAILY #30 

tablet


   Lisinopril 10 mg*** [Zestril 10 MG***] 10 mg PO DAILY #30 tablet


Additional Instructions: 


take meds as directed, no working with machinery or unprotected heights until 

followup in 2 weeks. refer for outpatient speech therapy


Follow up with: 


NOHEMI SAAVEDRA MD [Primary Care Provider] - 2 weeks